# Patient Record
Sex: MALE | Race: WHITE | NOT HISPANIC OR LATINO | Employment: FULL TIME | URBAN - NONMETROPOLITAN AREA
[De-identification: names, ages, dates, MRNs, and addresses within clinical notes are randomized per-mention and may not be internally consistent; named-entity substitution may affect disease eponyms.]

---

## 2017-01-10 ENCOUNTER — PATIENT MESSAGE (OUTPATIENT)
Dept: MEDICAL GROUP | Facility: PHYSICIAN GROUP | Age: 32
End: 2017-01-10

## 2018-01-20 DIAGNOSIS — F33.1 MAJOR DEPRESSIVE DISORDER, RECURRENT EPISODE, MODERATE (HCC): ICD-10-CM

## 2018-01-22 RX ORDER — PAROXETINE HYDROCHLORIDE 20 MG/1
TABLET, FILM COATED ORAL
Qty: 30 TAB | Refills: 0 | Status: SHIPPED | OUTPATIENT
Start: 2018-01-22 | End: 2018-02-21 | Stop reason: SDUPTHER

## 2018-02-21 ENCOUNTER — OFFICE VISIT (OUTPATIENT)
Dept: MEDICAL GROUP | Facility: PHYSICIAN GROUP | Age: 33
End: 2018-02-21
Payer: COMMERCIAL

## 2018-02-21 VITALS
SYSTOLIC BLOOD PRESSURE: 118 MMHG | WEIGHT: 195 LBS | DIASTOLIC BLOOD PRESSURE: 78 MMHG | HEIGHT: 70 IN | BODY MASS INDEX: 27.92 KG/M2 | RESPIRATION RATE: 12 BRPM | TEMPERATURE: 98.4 F | OXYGEN SATURATION: 96 % | HEART RATE: 95 BPM

## 2018-02-21 DIAGNOSIS — Z13.220 SCREENING FOR LIPID DISORDERS: ICD-10-CM

## 2018-02-21 DIAGNOSIS — F33.1 MODERATE EPISODE OF RECURRENT MAJOR DEPRESSIVE DISORDER (HCC): ICD-10-CM

## 2018-02-21 DIAGNOSIS — F33.1 MAJOR DEPRESSIVE DISORDER, RECURRENT EPISODE, MODERATE (HCC): ICD-10-CM

## 2018-02-21 PROCEDURE — 99214 OFFICE O/P EST MOD 30 MIN: CPT | Performed by: NURSE PRACTITIONER

## 2018-02-21 RX ORDER — PAROXETINE HYDROCHLORIDE 20 MG/1
20 TABLET, FILM COATED ORAL DAILY
Qty: 90 TAB | Refills: 3 | Status: SHIPPED | OUTPATIENT
Start: 2018-02-21 | End: 2019-02-19 | Stop reason: SDUPTHER

## 2018-02-21 ASSESSMENT — PATIENT HEALTH QUESTIONNAIRE - PHQ9
5. POOR APPETITE OR OVEREATING: 0 - NOT AT ALL
SUM OF ALL RESPONSES TO PHQ QUESTIONS 1-9: 5
CLINICAL INTERPRETATION OF PHQ2 SCORE: 1

## 2018-02-21 NOTE — ASSESSMENT & PLAN NOTE
Patient is a 32-year-old male with major depressive disorder for nearly 10 years, this has been well-controlled on Paxil 20 mg daily. He is here to establish care. We will refill him for one year. He has had no worsening depression or anxiety.

## 2018-02-21 NOTE — PROGRESS NOTES
"[facility]  Primary Care Office Visit - Problem-Oriented        History:     Deepak Gamboa is a 32 y.o. male who is here today to discuss Depressed (med refill) and Immunizations (TDAP)      Major depressive disorder  Patient is a 32-year-old male with major depressive disorder for nearly 10 years, this has been well-controlled on Paxil 20 mg daily. He is here to establish care. We will refill him for one year. He has had no worsening depression or anxiety.        Past Medical History:   Diagnosis Date   • Major depressive disorder 9/21/2012     History reviewed. No pertinent surgical history.  Social History     Social History   • Marital status: Single     Spouse name: N/A   • Number of children: N/A   • Years of education: N/A     Occupational History   • Not on file.     Social History Main Topics   • Smoking status: Never Smoker   • Smokeless tobacco: Never Used   • Alcohol use No   • Drug use: No   • Sexual activity: Yes     Other Topics Concern   • Not on file     Social History Narrative   • No narrative on file     History   Smoking Status   • Never Smoker   Smokeless Tobacco   • Never Used     Family History   Problem Relation Age of Onset   • Stroke Father      No Known Allergies    Problem List:     Patient Active Problem List    Diagnosis Date Noted   • Major depressive disorder 09/21/2012         Medications:     Current Outpatient Prescriptions:   •  PARoxetine (PAXIL) 20 MG Tab, Take 1 Tab by mouth every day. TAKE ONE TABLET BY MOUTH ONCE DAILY  WITH FOOD, Disp: 90 Tab, Rfl: 3      Review of Systems:     Pertinent positives as per HPI, all other systems reviewed and WNL     Physical Assessment:     VS: /78   Pulse 95   Temp 36.9 °C (98.4 °F)   Resp 12   Ht 1.778 m (5' 10\")   Wt 88.5 kg (195 lb)   SpO2 96%   BMI 27.98 kg/m²     General: Well-developed, well-nourished, male, over weight   Head: PERRL, EOMI. Normocephalic. No facial asymmetry noted.  Cardiovasc: RRR, no MRG. No " thrills or bruits. Pulses 2+ and symmetric at all distal extremities.  Pulmonary: Lungs clear bilaterally.  Normal respiratory effort. No wheeze or crackles.  Neuro: Alert and oriented  Skin:No rashes noted. Skin warm, dry, intact    Psych: Dressed appropriately for the weather, pleasant and conversant.  Affect, mood & judgment appropriate.      Assessment/Plan:   Deepak was seen today for depressed and immunizations.    Diagnoses and all orders for this visit:    Moderate episode of recurrent major depressive disorder (CMS-HCC), chronic, well controlled on present medications   -Continue current treatment, continue to support and monitor, follow up annually  -     PARoxetine (PAXIL) 20 MG Tab; Take 1 Tab by mouth every day. TAKE ONE TABLET BY MOUTH ONCE DAILY  WITH FOOD    Screening for lipid disorders  -     LIPID PROFILE; Future  -     COMP METABOLIC PANEL; Future      Patient is agreeable to the above plan and voiced understanding. All questions answered.     Please note that this dictation was created using voice recognition software. I have made every reasonable attempt to correct obvious errors, but I expect that there are errors of grammar and possibly content that I did not discover before finalizing the note.      VIJI Reese  2/21/2018, 10:26 AM

## 2019-02-19 DIAGNOSIS — F33.1 MODERATE EPISODE OF RECURRENT MAJOR DEPRESSIVE DISORDER (HCC): ICD-10-CM

## 2019-02-21 RX ORDER — PAROXETINE HYDROCHLORIDE 20 MG/1
TABLET, FILM COATED ORAL
Qty: 90 TAB | Refills: 0 | Status: SHIPPED | OUTPATIENT
Start: 2019-02-21 | End: 2019-02-27 | Stop reason: SDUPTHER

## 2019-02-21 NOTE — TELEPHONE ENCOUNTER
Was the patient seen in the last year in this department? Yes    Does patient have an active prescription for medications requested? No     Received Request Via: Pharmacy      Pt met protocol?: No, OV 2/18

## 2019-02-27 ENCOUNTER — OFFICE VISIT (OUTPATIENT)
Dept: MEDICAL GROUP | Facility: PHYSICIAN GROUP | Age: 34
End: 2019-02-27

## 2019-02-27 VITALS
RESPIRATION RATE: 14 BRPM | WEIGHT: 200.5 LBS | TEMPERATURE: 98.4 F | HEIGHT: 70 IN | BODY MASS INDEX: 28.71 KG/M2 | SYSTOLIC BLOOD PRESSURE: 120 MMHG | DIASTOLIC BLOOD PRESSURE: 80 MMHG | OXYGEN SATURATION: 96 % | HEART RATE: 105 BPM

## 2019-02-27 DIAGNOSIS — F33.1 MODERATE EPISODE OF RECURRENT MAJOR DEPRESSIVE DISORDER (HCC): ICD-10-CM

## 2019-02-27 PROCEDURE — 99213 OFFICE O/P EST LOW 20 MIN: CPT | Performed by: NURSE PRACTITIONER

## 2019-02-27 RX ORDER — PAROXETINE HYDROCHLORIDE 20 MG/1
TABLET, FILM COATED ORAL
Qty: 90 TAB | Refills: 3 | Status: SHIPPED | OUTPATIENT
Start: 2019-02-27 | End: 2020-03-12 | Stop reason: SDUPTHER

## 2019-02-27 NOTE — PROGRESS NOTES
"[facility]  Primary Care Office Visit - Problem-Oriented        History:     Deepak Gamboa is a 33 y.o. male who is here today to discuss Depression (medication refill-paxil)      Major depressive disorder  This is a chronic condition which is well controlled on medications. Patient is tolerating medications without side effects.      Not interested in influenza or Tdap     Past Medical History:   Diagnosis Date   • Major depressive disorder 9/21/2012     History reviewed. No pertinent surgical history.  Social History     Social History   • Marital status: Single     Spouse name: N/A   • Number of children: N/A   • Years of education: N/A     Occupational History   • Not on file.     Social History Main Topics   • Smoking status: Never Smoker   • Smokeless tobacco: Never Used   • Alcohol use No   • Drug use: No   • Sexual activity: Yes     Other Topics Concern   • Not on file     Social History Narrative   • No narrative on file     History   Smoking Status   • Never Smoker   Smokeless Tobacco   • Never Used     Family History   Problem Relation Age of Onset   • Stroke Father      No Known Allergies    Problem List:     Patient Active Problem List    Diagnosis Date Noted   • Major depressive disorder 09/21/2012         Medications:     Current Outpatient Prescriptions:   •  PARoxetine (PAXIL) 20 MG Tab, TAKE 1 TABLET BY MOUTH ONCE DAILY WITH FOOD, Disp: 90 Tab, Rfl: 3      Review of Systems:     Pertinent positives as per HPI, all other systems reviewed and WNL     Physical Assessment:     VS: /80 (BP Location: Left arm, Patient Position: Sitting, BP Cuff Size: Adult)   Pulse (!) 105   Temp 36.9 °C (98.4 °F)   Resp 14   Ht 1.778 m (5' 10\")   Wt 90.9 kg (200 lb 8 oz)   SpO2 96%   BMI 28.77 kg/m²     General: Well-developed, well-nourished, male     Head: PERRL, EOMI. Normocephalic. No facial asymmetry noted.  Cardiovasc:RRR, no MRG. No thrills or bruits. Pulses 2+ and symmetric at all distal " extremities.  Pulmonary: Lungs clear bilaterally.  Normal respiratory effort. No wheeze or crackles.   Neuro: Alert and oriented, CNs II-XII intact, no focal deficits.  Skin:No rashes noted. Skin warm, dry, intact    Psych: Dressed appropriately for the weather, pleasant and conversant.  Affect, mood & judgment appropriate.    Assessment/Plan:   Deepak was seen today for depression.    Diagnoses and all orders for this visit:    Moderate episode of recurrent major depressive disorder (HCC), chronic, controlled on present treatment, monitor at least annually   -     PARoxetine (PAXIL) 20 MG Tab; TAKE 1 TABLET BY MOUTH ONCE DAILY WITH FOOD      Patient is agreeable to the above plan and voiced understanding. All questions answered.     Please note that this dictation was created using voice recognition software. I have made every reasonable attempt to correct obvious errors, but I expect that there are errors of grammar and possibly content that I did not discover before finalizing the note.      VIJI Reese  2/27/2019, 11:01 AM

## 2019-11-19 ENCOUNTER — OFFICE VISIT (OUTPATIENT)
Dept: URGENT CARE | Facility: PHYSICIAN GROUP | Age: 34
End: 2019-11-19

## 2019-11-19 VITALS
TEMPERATURE: 97.6 F | WEIGHT: 193 LBS | DIASTOLIC BLOOD PRESSURE: 88 MMHG | SYSTOLIC BLOOD PRESSURE: 122 MMHG | HEART RATE: 104 BPM | BODY MASS INDEX: 27.63 KG/M2 | RESPIRATION RATE: 16 BRPM | OXYGEN SATURATION: 96 % | HEIGHT: 70 IN

## 2019-11-19 DIAGNOSIS — J03.90 TONSILLITIS: ICD-10-CM

## 2019-11-19 LAB
INT CON NEG: NEGATIVE
INT CON POS: POSITIVE
S PYO AG THROAT QL: NEGATIVE

## 2019-11-19 PROCEDURE — 99214 OFFICE O/P EST MOD 30 MIN: CPT | Performed by: PHYSICIAN ASSISTANT

## 2019-11-19 PROCEDURE — 87880 STREP A ASSAY W/OPTIC: CPT | Performed by: PHYSICIAN ASSISTANT

## 2019-11-19 RX ORDER — AMOXICILLIN 500 MG/1
500 CAPSULE ORAL 2 TIMES DAILY
Qty: 20 CAP | Refills: 0 | Status: SHIPPED | OUTPATIENT
Start: 2019-11-19 | End: 2019-11-29

## 2019-11-19 RX ORDER — METHYLPREDNISOLONE 4 MG/1
TABLET ORAL
Qty: 1 PACKAGE | Refills: 0 | Status: SHIPPED
Start: 2019-11-19 | End: 2020-03-12

## 2019-11-22 ASSESSMENT — ENCOUNTER SYMPTOMS
SHORTNESS OF BREATH: 0
MUSCULOSKELETAL NEGATIVE: 1
DIZZINESS: 0
FEVER: 0
CHILLS: 0
SPUTUM PRODUCTION: 0
SORE THROAT: 1
HEMOPTYSIS: 0
TROUBLE SWALLOWING: 1
NAUSEA: 0
VOMITING: 0
DIARRHEA: 0
ABDOMINAL PAIN: 0
SWOLLEN GLANDS: 1
COUGH: 0
WHEEZING: 0

## 2019-11-22 NOTE — PROGRESS NOTES
"Subjective:      Deepak Gamboa is a 34 y.o. male who presents with Pharyngitis            Pharyngitis    This is a new problem. The current episode started in the past 7 days. The problem has been unchanged. Neither side of throat is experiencing more pain than the other. The pain is at a severity of 3/10. The pain is mild. Associated symptoms include swollen glands and trouble swallowing. Pertinent negatives include no abdominal pain, congestion, coughing, diarrhea, ear pain, shortness of breath or vomiting. He has had exposure to strep. He has had no exposure to mono. He has tried nothing for the symptoms.     No known sick contacts or history of tonsillectomy.     Review of Systems   Constitutional: Negative for chills and fever.   HENT: Positive for sore throat and trouble swallowing. Negative for congestion and ear pain.    Respiratory: Negative for cough, hemoptysis, sputum production, shortness of breath and wheezing.    Cardiovascular: Negative for chest pain.   Gastrointestinal: Negative for abdominal pain, diarrhea, nausea and vomiting.   Genitourinary: Negative.    Musculoskeletal: Negative.    Skin: Negative for rash.   Neurological: Negative for dizziness.        Objective:     /88 (BP Location: Left arm, Patient Position: Sitting, BP Cuff Size: Large adult)   Pulse (!) 104   Temp 36.4 °C (97.6 °F) (Temporal)   Resp 16   Ht 1.778 m (5' 10\")   Wt 87.5 kg (193 lb)   SpO2 96%   BMI 27.69 kg/m²      Physical Exam  Vitals signs and nursing note reviewed.   Constitutional:       Appearance: Normal appearance. He is well-developed. He is ill-appearing.   HENT:      Head: Normocephalic and atraumatic.      Right Ear: Hearing, tympanic membrane, ear canal and external ear normal.      Left Ear: Hearing, tympanic membrane, ear canal and external ear normal.      Nose: No congestion or rhinorrhea.      Mouth/Throat:      Pharynx: Uvula midline. Posterior oropharyngeal erythema present. No " oropharyngeal exudate.      Tonsils: No tonsillar exudate. Swelling: 3+ on the right. 3+ on the left.   Eyes:      General:         Right eye: No discharge.         Left eye: No discharge.      Conjunctiva/sclera: Conjunctivae normal.      Pupils: Pupils are equal, round, and reactive to light.   Neck:      Musculoskeletal: Normal range of motion.   Cardiovascular:      Rate and Rhythm: Normal rate and regular rhythm.      Heart sounds: Normal heart sounds. No murmur.   Pulmonary:      Effort: Pulmonary effort is normal.      Breath sounds: No wheezing or rales.   Musculoskeletal: Normal range of motion.   Neurological:      Mental Status: He is alert and oriented to person, place, and time.   Psychiatric:         Behavior: Behavior normal.            PMH:  has a past medical history of Major depressive disorder (9/21/2012).  MEDS:   Current Outpatient Medications:   •  methylPREDNISolone (MEDROL DOSEPAK) 4 MG Tablet Therapy Pack, Take as directed, Disp: 1 Package, Rfl: 0  •  amoxicillin (AMOXIL) 500 MG Cap, Take 1 Cap by mouth 2 times a day for 10 days., Disp: 20 Cap, Rfl: 0  •  PARoxetine (PAXIL) 20 MG Tab, TAKE 1 TABLET BY MOUTH ONCE DAILY WITH FOOD, Disp: 90 Tab, Rfl: 3  ALLERGIES: No Known Allergies  SURGHX: History reviewed. No pertinent surgical history.  SOCHX:  reports that he has never smoked. He has never used smokeless tobacco. He reports that he does not drink alcohol or use drugs.  FH: family history includes Stroke in his father.       Assessment/Plan:       1. Tonsillitis  - POCT Rapid Strep A: NEGATIVE   - methylPREDNISolone (MEDROL DOSEPAK) 4 MG Tablet Therapy Pack; Take as directed  Dispense: 1 Package; Refill: 0  - amoxicillin (AMOXIL) 500 MG Cap; Take 1 Cap by mouth 2 times a day for 10 days.  Dispense: 20 Cap; Refill: 0   - Complete full course of antibiotics as prescribed     POC strep negative at today's visit, advised patient symptoms are most likely viral in etiology. Encouraged increased  fluids and rest. Encouraged warm salt water gargles as needed for symptomatic relief. Contingent course of amoxicillin provided at today's visit. Call or return to office if symptoms persist or worsen. The patient demonstrated a good understanding and agreed with the treatment plan.

## 2020-03-12 ENCOUNTER — OFFICE VISIT (OUTPATIENT)
Dept: MEDICAL GROUP | Facility: PHYSICIAN GROUP | Age: 35
End: 2020-03-12

## 2020-03-12 VITALS
WEIGHT: 205 LBS | BODY MASS INDEX: 29.35 KG/M2 | OXYGEN SATURATION: 93 % | SYSTOLIC BLOOD PRESSURE: 124 MMHG | TEMPERATURE: 97.1 F | HEIGHT: 70 IN | HEART RATE: 89 BPM | RESPIRATION RATE: 14 BRPM | DIASTOLIC BLOOD PRESSURE: 80 MMHG

## 2020-03-12 DIAGNOSIS — Z13.29 SCREENING FOR THYROID DISORDER: ICD-10-CM

## 2020-03-12 DIAGNOSIS — F33.1 MODERATE EPISODE OF RECURRENT MAJOR DEPRESSIVE DISORDER (HCC): ICD-10-CM

## 2020-03-12 DIAGNOSIS — Z13.21 ENCOUNTER FOR VITAMIN DEFICIENCY SCREENING: ICD-10-CM

## 2020-03-12 DIAGNOSIS — Z13.6 SCREENING FOR CARDIOVASCULAR CONDITION: ICD-10-CM

## 2020-03-12 PROCEDURE — 99213 OFFICE O/P EST LOW 20 MIN: CPT | Performed by: NURSE PRACTITIONER

## 2020-03-12 RX ORDER — PAROXETINE HYDROCHLORIDE 20 MG/1
TABLET, FILM COATED ORAL
Qty: 90 TAB | Refills: 3 | Status: SHIPPED | OUTPATIENT
Start: 2020-03-12 | End: 2021-03-24 | Stop reason: SDUPTHER

## 2020-03-12 ASSESSMENT — PATIENT HEALTH QUESTIONNAIRE - PHQ9
3. TROUBLE FALLING OR STAYING ASLEEP OR SLEEPING TOO MUCH: SEVERAL DAYS
5. POOR APPETITE OR OVEREATING: NOT AT ALL
SUM OF ALL RESPONSES TO PHQ9 QUESTIONS 1 AND 2: 0
2. FEELING DOWN, DEPRESSED, IRRITABLE, OR HOPELESS: NOT AT ALL
SUM OF ALL RESPONSES TO PHQ QUESTIONS 1-9: 2
4. FEELING TIRED OR HAVING LITTLE ENERGY: SEVERAL DAYS
1. LITTLE INTEREST OR PLEASURE IN DOING THINGS: NOT AT ALL
7. TROUBLE CONCENTRATING ON THINGS, SUCH AS READING THE NEWSPAPER OR WATCHING TELEVISION: NOT AT ALL
6. FEELING BAD ABOUT YOURSELF - OR THAT YOU ARE A FAILURE OR HAVE LET YOURSELF OR YOUR FAMILY DOWN: NOT AL ALL
8. MOVING OR SPEAKING SO SLOWLY THAT OTHER PEOPLE COULD HAVE NOTICED. OR THE OPPOSITE, BEING SO FIGETY OR RESTLESS THAT YOU HAVE BEEN MOVING AROUND A LOT MORE THAN USUAL: NOT AT ALL
9. THOUGHTS THAT YOU WOULD BE BETTER OFF DEAD, OR OF HURTING YOURSELF: NOT AT ALL

## 2020-03-12 NOTE — ASSESSMENT & PLAN NOTE
Patient is 35-year-old male here to establish care with me today.  Reports depression has been a chronic health problem for over 10 years.  Has been taking Paxil with good management since then.  Has not had counseling in the past.  Does not exercise routinely, though is very active at work as .  .  Denies SI/HI.

## 2020-03-12 NOTE — PROGRESS NOTES
"CC: Establish care, depression    HISTORY OF THE PRESENT ILLNESS: Patient is a 35 y.o. male. This pleasant patient is here today to establish care and for evaluation and management of the following health problems.  Patient's previous primary care provider is Soledad SORIA.      Health Maintenance:  Patient declined flu vaccine, though we reviewed benefits of flu vaccine.  Encouraged him to wash his hands frequently and stay out of environments with people who are ill.        Major depressive disorder  Patient is 35-year-old male here to establish care with me today.  Reports depression has been a chronic health problem for over 10 years.  Has been taking Paxil with good management since then.  Has not had counseling in the past.  Does not exercise routinely, though is very active at work as .  .  Denies SI/HI.        Allergies: Patient has no known allergies.    Current Outpatient Medications Ordered in Epic   Medication Sig Dispense Refill   • PARoxetine (PAXIL) 20 MG Tab TAKE 1 TABLET BY MOUTH ONCE DAILY WITH FOOD 90 Tab 3     No current Epic-ordered facility-administered medications on file.        Past Medical History:   Diagnosis Date   • Major depressive disorder 9/21/2012       History reviewed. No pertinent surgical history.    Social History     Tobacco Use   • Smoking status: Never Smoker   • Smokeless tobacco: Never Used   Substance Use Topics   • Alcohol use: No     Alcohol/week: 0.0 oz   • Drug use: No       Family History   Problem Relation Age of Onset   • Stroke Father        ROS:      As in HPI, otherwise negative for chest pain, dyspnea, abdominal pain, dysuria, blood in stool, fever             Exam: /80 (BP Location: Right arm, Patient Position: Sitting, BP Cuff Size: Adult)   Pulse 89   Temp 36.2 °C (97.1 °F)   Resp 14   Ht 1.778 m (5' 10\")   Wt 93 kg (205 lb)   SpO2 93%  Body mass index is 29.41 kg/m².    General: Alert, pleasant, well nourished, well " developed male in NAD  HEENT: Normocephalic. Eyes conjunctiva clear lids without ptosis, pupils equal and reactive to light, ears normal shape and contour, canals are clear bilaterally, tympanic membranes are pearly gray with good light reflex, nasal mucosa without erythema and drainage, oropharynx is without erythema, edema or exudates.   Neck: Supple without bruit. Thyroid is not enlarged.  Pulmonary: Clear to ausculation.  Normal effort. No rales, ronchi, or wheezing.  Cardiovascular: Normal rate and rhythm without murmur. Carotid and radial pulses are intact and equal bilaterally.  No lower extremity edema.  Abdomen: Soft, nontender, nondistended. Normal bowel sounds. Liver and spleen are not palpable  Neurologic: Grossly nonfocal  Lymph: No cervical or supraclavicular lymph nodes are palpable  Skin: Warm and dry.  Sunburnt on arms and legs.  Musculoskeletal: Normal gait.   Psych: Normal mood and affect. Alert and oriented. Judgment and insight is normal.    Please note that this dictation was created using voice recognition software. I have made every reasonable attempt to correct obvious errors, but I expect that there are errors of grammar and possibly content that I did not discover before finalizing the note.      Assessment/Plan  1. Moderate episode of recurrent major depressive disorder (HCC)  Well-controlled.  Continue with Paxil.  Advised on routine aerobic exercise.  - PARoxetine (PAXIL) 20 MG Tab; TAKE 1 TABLET BY MOUTH ONCE DAILY WITH FOOD  Dispense: 90 Tab; Refill: 3    2. Screening for cardiovascular condition    - Comp Metabolic Panel; Future  - ESTIMATED GFR; Future    3. Screening for thyroid disorder    - TSH; Future  - FREE THYROXINE; Future    4. Encounter for vitamin deficiency screening    - VITAMIN D,25 HYDROXY; Future    Patient has not had any screening lab work done.  He does not have any insurance at this point, though he is given the option at work.  Reports when he did have  "insurance, seems like \"they never covered anything.\"  He will get labs done at cash pay at [facility].  If can only afford one lab, advised patient to get CMP. Strongly advised patient to look into health insurance coverage just in case of hospitalization.  Advised on sunscreen and keeping covered while working outside.    Patient will return to clinic annually or sooner if needed.  "

## 2020-11-18 ENCOUNTER — OFFICE VISIT (OUTPATIENT)
Dept: MEDICAL GROUP | Facility: PHYSICIAN GROUP | Age: 35
End: 2020-11-18

## 2020-11-18 VITALS
BODY MASS INDEX: 28.35 KG/M2 | WEIGHT: 198 LBS | SYSTOLIC BLOOD PRESSURE: 118 MMHG | HEIGHT: 70 IN | HEART RATE: 104 BPM | TEMPERATURE: 97.8 F | OXYGEN SATURATION: 96 % | DIASTOLIC BLOOD PRESSURE: 84 MMHG

## 2020-11-18 DIAGNOSIS — H92.03 ACUTE OTALGIA, BILATERAL: ICD-10-CM

## 2020-11-18 PROCEDURE — 99213 OFFICE O/P EST LOW 20 MIN: CPT | Performed by: NURSE PRACTITIONER

## 2020-11-18 RX ORDER — AMOXICILLIN 500 MG/1
500 CAPSULE ORAL 3 TIMES DAILY
Qty: 30 CAP | Refills: 0 | Status: SHIPPED | OUTPATIENT
Start: 2020-11-18 | End: 2021-03-24

## 2020-11-18 NOTE — PROGRESS NOTES
"CC: Otalgia    HISTORY OF THE PRESENT ILLNESS: Patient is a 35 y.o. male. This pleasant patient is here today for evaluation and management of the following health problems.      Acute otalgia, bilateral  Patient reports bilateral otalgia.  Left ear started 2 weeks ago, wax and waned.  Woke this morning with both ears hurting.  He is worried that he has an ear infection.  Denies fever, chills, sore throat, cough, nasal congestion.  Does wear earplugs at night because his neighbor is noisy.      Allergies: Patient has no known allergies.    Current Outpatient Medications Ordered in Epic   Medication Sig Dispense Refill   • PARoxetine (PAXIL) 20 MG Tab TAKE 1 TABLET BY MOUTH ONCE DAILY WITH FOOD 90 Tab 3     No current Epic-ordered facility-administered medications on file.        Past Medical History:   Diagnosis Date   • Major depressive disorder 9/21/2012       No past surgical history on file.    Social History     Tobacco Use   • Smoking status: Never Smoker   • Smokeless tobacco: Never Used   Substance Use Topics   • Alcohol use: No     Alcohol/week: 0.0 oz   • Drug use: No       Family History   Problem Relation Age of Onset   • Stroke Father        ROS:   As in HPI, otherwise negative for chest pain, dyspnea, abdominal pain, dysuria, blood in stool, fever          Exam: /84 (BP Location: Left arm, Patient Position: Sitting, BP Cuff Size: Adult)   Pulse (!) 104   Temp 36.6 °C (97.8 °F) (Temporal)   Ht 1.778 m (5' 10\")   Wt 89.8 kg (198 lb)   SpO2 96%  Body mass index is 28.41 kg/m².    General: Well nourished, well developed male in NAD  HEENT: Normocephalic. Eyes conjunctiva clear lids without ptosis, pupils equal and reactive to light, ears normal shape and contour   Ear canals packed with dark cerumen.  Ear lavage performed by medical assistant, and copious amounts of cerumen removed.  After ear lavage, canals are pink and intact, clear of cerumen.  Left tympanic membranes are pearly gray with " good light reflex, non bulging.  Right tympanic membrane mildly red.  Oropharynx is without erythema, edema or exudates.   Neck: Supple without bruit. Thyroid is not enlarged.  Pulmonary: Clear to ausculation.  Normal effort. No rales, ronchi, or wheezing.  Cardiovascular: Normal rate and rhythm without murmur.   Neurologic: Grossly nonfocal  Lymph: No cervical or supraclavicular lymph nodes are palpable  Skin: Warm and dry.    Psych: Normal mood and affect. Alert and oriented. Judgment and insight is normal.    Please note that this dictation was created using voice recognition software. I have made every reasonable attempt to correct obvious errors, but I expect that there are errors of grammar and possibly content that I did not discover before finalizing the note.      Assessment/Plan  1. Acute otalgia, bilateral  Patient tolerated ear lavage.  Patient reports good relief of pain.  Right TM with mild redness.  Will prescribe contingent antibiotic to take if pain returns or worsens.  Prescribed amoxicillin.  Instructions and side effects reviewed with patient.

## 2020-11-19 ENCOUNTER — TELEPHONE (OUTPATIENT)
Dept: MEDICAL GROUP | Facility: PHYSICIAN GROUP | Age: 35
End: 2020-11-19

## 2020-11-19 NOTE — ASSESSMENT & PLAN NOTE
Patient reports bilateral otalgia.  Left ear started 2 weeks ago, wax and waned.  Woke this morning with both ears hurting.  He is worried that he has an ear infection.  Denies fever, chills, sore throat, cough, nasal congestion.  Does wear earplugs at night because his neighbor is noisy.

## 2020-11-19 NOTE — TELEPHONE ENCOUNTER
Deepak called to let us know that both of his ears are still hurting very bad. Is there a numbing drop we can send in for him?

## 2020-11-20 RX ORDER — OFLOXACIN 3 MG/ML
5 SOLUTION AURICULAR (OTIC) DAILY
Qty: 10 ML | Refills: 0 | Status: SHIPPED | OUTPATIENT
Start: 2020-11-20 | End: 2021-03-24

## 2020-11-20 NOTE — TELEPHONE ENCOUNTER
Pt's family states they have been applying warm compresses and had taken ibuprofen once with no affect. Orders given to pt to continue warm compresses and start ibuprofen/tylenol. Family states pt is still in severe pain with no relief. Informed family about antibiotic orders. Please advise.

## 2020-11-20 NOTE — TELEPHONE ENCOUNTER
No.  Please advise him to start the antibiotic that I prescribed and give it a couple days to start working.  He can apply warm compresses to his ears and take Tylenol or ibuprofen.

## 2020-11-20 NOTE — TELEPHONE ENCOUNTER
I have just sent prescription for ofloxacin ear drops to use in addition to the oral antibiotic to see if this will help.  If patient continues to have severe pain, may need to see ENT.

## 2021-03-24 ENCOUNTER — OFFICE VISIT (OUTPATIENT)
Dept: MEDICAL GROUP | Facility: PHYSICIAN GROUP | Age: 36
End: 2021-03-24

## 2021-03-24 VITALS
TEMPERATURE: 98.3 F | WEIGHT: 210.5 LBS | OXYGEN SATURATION: 99 % | SYSTOLIC BLOOD PRESSURE: 128 MMHG | HEIGHT: 69 IN | HEART RATE: 98 BPM | BODY MASS INDEX: 31.18 KG/M2 | DIASTOLIC BLOOD PRESSURE: 82 MMHG | RESPIRATION RATE: 16 BRPM

## 2021-03-24 DIAGNOSIS — J35.1 LARGE TONSILS: ICD-10-CM

## 2021-03-24 DIAGNOSIS — F33.1 MODERATE EPISODE OF RECURRENT MAJOR DEPRESSIVE DISORDER (HCC): ICD-10-CM

## 2021-03-24 DIAGNOSIS — Z13.6 SCREENING FOR CARDIOVASCULAR CONDITION: ICD-10-CM

## 2021-03-24 PROBLEM — H92.03 ACUTE OTALGIA, BILATERAL: Status: RESOLVED | Noted: 2020-11-18 | Resolved: 2021-03-24

## 2021-03-24 PROCEDURE — 99213 OFFICE O/P EST LOW 20 MIN: CPT | Performed by: NURSE PRACTITIONER

## 2021-03-24 RX ORDER — PAROXETINE HYDROCHLORIDE 20 MG/1
TABLET, FILM COATED ORAL
Qty: 90 TABLET | Refills: 3 | Status: SHIPPED | OUTPATIENT
Start: 2021-03-24 | End: 2022-04-14 | Stop reason: SDUPTHER

## 2021-03-24 NOTE — PROGRESS NOTES
CC: Medication refill, request for labs    HISTORY OF THE PRESENT ILLNESS: Patient is a 36 y.o. male. This pleasant patient is here today for evaluation and management of the following health problems.    Patient was to have labs done prior to today's appointment, but he has not done these.  Unfortunately, he does not have insurance.  I have advised him on cash lab across the street.        Major depressive disorder  This is a chronic health problem that is well controlled with current medications and lifestyle measures.  Taking Paxil 20 mg daily.  Reports father and sister passed away last year.  Patient feels he is grieving appropriately.  His wife and mother are good support.  Denies SI/HI.  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    Large tonsils  Large tonsils found on exam today.  Patient reports he snores frequently.  Denies daytime fatigue.  Reports frequent tonsillitis, though not in the last year.  Has never had sleep study done.      Allergies: Patient has no known allergies.    Current Outpatient Medications Ordered in Epic   Medication Sig Dispense Refill   • PARoxetine (PAXIL) 20 MG Tab TAKE 1 TABLET BY MOUTH ONCE DAILY WITH FOOD 90 tablet 3   • ofloxacin otic sol (FLOXIN OTIC) 0.3 % Solution Administer 5 Drops into affected ear(s) every day. Administer drops to both ears. (Patient not taking: Reported on 3/24/2021) 10 mL 0   • amoxicillin (AMOXIL) 500 MG Cap Take 1 Cap by mouth 3 times a day. (Patient not taking: Reported on 3/24/2021) 30 Cap 0     No current Epic-ordered facility-administered medications on file.       Past Medical History:   Diagnosis Date   • Major depressive disorder 9/21/2012       No past surgical history on file.    Social History     Tobacco Use   • Smoking status: Never Smoker   • Smokeless tobacco: Never Used   Substance Use Topics   • Alcohol use: No     Alcohol/week: 0.0 oz   • Drug use: No       Family History   Problem Relation Age of Onset   •  "Stroke Father        ROS:   As in HPI, otherwise negative for chest pain, dyspnea, abdominal pain, dysuria, blood in stool, fever, night sweats      Exam: /82   Pulse 98   Temp 36.8 °C (98.3 °F) (Temporal)   Resp 16   Ht 1.753 m (5' 9\")   Wt 95.5 kg (210 lb 8 oz)   SpO2 99%  Body mass index is 31.09 kg/m².    General: Alert, pleasant, well nourished, well developed male in NAD  HEENT: Normocephalic. Eyes conjunctiva clear lids without ptosis, pupils equal and reactive to light, ears normal shape and contour, canals are clear bilaterally, right tympanic membranes are pearly gray with good light reflex, left tympanic membrane with small amount of cloudy fluid posteriorly, nasal mucosa without erythema and drainage, oropharynx is without erythema, edema or exudates.  Tonsils 2+, no erythema or exudates  Neck: Supple without bruit. Thyroid is not enlarged.  Pulmonary: Clear to ausculation.  Normal effort. No rales, ronchi, or wheezing.  Cardiovascular: Normal rate and rhythm without murmur. Carotid and radial pulses are intact and equal bilaterally.  No lower extremity edema.  Abdomen: Soft, nontender, nondistended. Normal bowel sounds. Liver and spleen are not palpable  Neurologic: Grossly nonfocal  Lymph: No cervical or supraclavicular lymph nodes are palpable  Skin: Warm and dry.    Musculoskeletal: Normal gait.   Psych: Normal mood and affect. Alert and oriented. Judgment and insight is normal.    Please note that this dictation was created using voice recognition software. I have made every reasonable attempt to correct obvious errors, but I expect that there are errors of grammar and possibly content that I did not discover before finalizing the note.      Assessment/Plan  1. Moderate episode of recurrent major depressive disorder (HCC)  Well-controlled.  Continue with Paxil, no changes.  Advised on routine aerobic exercise.  Patient declines counseling today.  - PARoxetine (PAXIL) 20 MG Tab; TAKE 1 " TABLET BY MOUTH ONCE DAILY WITH FOOD  Dispense: 90 tablet; Refill: 3    2. Screening for cardiovascular condition  Patient reports his mom is requesting he get some labs done.  He is mildly concerned as he used [place] frequently in his job over 15 years ago.  Denies any symptoms.  Patient will get labs done at [facility] [place].  He will let me know once he has these done.  - Comp Metabolic Panel; Future  - ESTIMATED GFR; Future  - Lipid Profile; Future  - CBC WITH DIFFERENTIAL; Future  - FERRITIN; Future  - VITAMIN B12; Future    3. Large tonsils  Tonsils enlarged today, no sign of infection.  Snores frequently.  Did advise patient that he may have obstructive sleep apnea.  Reviewed potential consequences of sleep apnea including heart disease, fatigue.  I advised on referral to ENT or sleep management for sleep study.  Unfortunately, patient does not have insurance.  I strongly advised him to consider signing up for insurance through his employer during next sign up period.    Some fluid seen posteriorly left tympanic membrane.  Advised patient to start Flonase nasal spray daily.    Patient will return to clinic annually or sooner if needed.

## 2021-03-24 NOTE — ASSESSMENT & PLAN NOTE
This is a chronic health problem that is well controlled with current medications and lifestyle measures.  Taking Paxil 20 mg daily.  Reports father and sister passed away last year.  Patient feels he is grieving appropriately.  His wife and mother are good support.  Denies SI/HI.  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

## 2021-03-24 NOTE — ASSESSMENT & PLAN NOTE
Large tonsils found on exam today.  Patient reports he snores frequently.  Denies daytime fatigue.  Reports frequent tonsillitis, though not in the last year.  Has never had sleep study done.

## 2022-04-14 ENCOUNTER — OFFICE VISIT (OUTPATIENT)
Dept: MEDICAL GROUP | Facility: PHYSICIAN GROUP | Age: 37
End: 2022-04-14

## 2022-04-14 VITALS
HEIGHT: 69 IN | BODY MASS INDEX: 31.99 KG/M2 | DIASTOLIC BLOOD PRESSURE: 68 MMHG | OXYGEN SATURATION: 99 % | HEART RATE: 94 BPM | RESPIRATION RATE: 14 BRPM | TEMPERATURE: 98.3 F | SYSTOLIC BLOOD PRESSURE: 118 MMHG | WEIGHT: 216 LBS

## 2022-04-14 DIAGNOSIS — J35.1 LARGE TONSILS: ICD-10-CM

## 2022-04-14 DIAGNOSIS — E66.9 OBESITY (BMI 30-39.9): ICD-10-CM

## 2022-04-14 DIAGNOSIS — M77.8 TENDINITIS OF RIGHT SHOULDER: ICD-10-CM

## 2022-04-14 DIAGNOSIS — F33.1 MODERATE EPISODE OF RECURRENT MAJOR DEPRESSIVE DISORDER (HCC): ICD-10-CM

## 2022-04-14 DIAGNOSIS — Z13.6 SCREENING FOR CARDIOVASCULAR CONDITION: ICD-10-CM

## 2022-04-14 PROCEDURE — 99213 OFFICE O/P EST LOW 20 MIN: CPT | Performed by: NURSE PRACTITIONER

## 2022-04-14 RX ORDER — PAROXETINE HYDROCHLORIDE 20 MG/1
TABLET, FILM COATED ORAL
Qty: 90 TABLET | Refills: 3 | Status: SHIPPED | OUTPATIENT
Start: 2022-04-14 | End: 2023-04-14 | Stop reason: SDUPTHER

## 2022-04-14 ASSESSMENT — PATIENT HEALTH QUESTIONNAIRE - PHQ9
7. TROUBLE CONCENTRATING ON THINGS, SUCH AS READING THE NEWSPAPER OR WATCHING TELEVISION: NOT AT ALL
1. LITTLE INTEREST OR PLEASURE IN DOING THINGS: NOT AT ALL
8. MOVING OR SPEAKING SO SLOWLY THAT OTHER PEOPLE COULD HAVE NOTICED. OR THE OPPOSITE, BEING SO FIGETY OR RESTLESS THAT YOU HAVE BEEN MOVING AROUND A LOT MORE THAN USUAL: NOT AT ALL
5. POOR APPETITE OR OVEREATING: NOT AT ALL
9. THOUGHTS THAT YOU WOULD BE BETTER OFF DEAD, OR OF HURTING YOURSELF: NOT AT ALL
SUM OF ALL RESPONSES TO PHQ QUESTIONS 1-9: 1
3. TROUBLE FALLING OR STAYING ASLEEP OR SLEEPING TOO MUCH: NOT AT ALL
SUM OF ALL RESPONSES TO PHQ9 QUESTIONS 1 AND 2: 0
4. FEELING TIRED OR HAVING LITTLE ENERGY: SEVERAL DAYS
2. FEELING DOWN, DEPRESSED, IRRITABLE, OR HOPELESS: NOT AT ALL
6. FEELING BAD ABOUT YOURSELF - OR THAT YOU ARE A FAILURE OR HAVE LET YOURSELF OR YOUR FAMILY DOWN: NOT AL ALL

## 2022-04-14 NOTE — PATIENT INSTRUCTIONS

## 2022-04-14 NOTE — PROGRESS NOTES
CC: Medication refill    HISTORY OF THE PRESENT ILLNESS: Patient is a 37 y.o. male. This pleasant patient is here today for evaluation and management of the following health problems.        Major depressive disorder  This is a chronic health problem that is well controlled with current medications and lifestyle measures.  Taking paroxetine 20 mg daily.    , wife is supportive.  Denies SI/HI.  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  Does not exercise aerobically routinely.    Tendinitis of right shoulder  Patient reports anterior right shoulder pain since trimming trees about 3 weeks ago.  Was holding sara above head.  Has been sore ever since.  Has not tried anything to help the pain except for got a massage, which did help.  Sometimes pain radiates down anterior arm, especially when reaching backward.  Denies weakness, numbness and tingling, erythema.    Large tonsils  Continues with large tonsils.  Denies pain.  Snoring is better if he lays on his side.  Cannot afford sleep study.      Allergies: Patient has no known allergies.    Current Outpatient Medications Ordered in Epic   Medication Sig Dispense Refill   • PARoxetine (PAXIL) 20 MG Tab TAKE 1 TABLET BY MOUTH ONCE DAILY WITH FOOD 90 Tablet 3     No current Epic-ordered facility-administered medications on file.       Past Medical History:   Diagnosis Date   • Major depressive disorder 9/21/2012       No past surgical history on file.    Social History     Tobacco Use   • Smoking status: Never Smoker   • Smokeless tobacco: Never Used   Substance Use Topics   • Alcohol use: No     Alcohol/week: 0.0 oz   • Drug use: No       Family History   Problem Relation Age of Onset   • Stroke Father    • Other Sister         aspiration   • Multiple Sclerosis Mother    • Other Brother         pituitary tumor       ROS:   As in HPI, otherwise negative for chest pain, dyspnea, abdominal pain, dysuria, blood in stool, fever           Exam:  "/68   Pulse 94   Temp 36.8 °C (98.3 °F) (Temporal)   Resp 14   Ht 1.753 m (5' 9\")   Wt 98 kg (216 lb)   SpO2 99%  Body mass index is 31.9 kg/m².    General: Alert, pleasant, well nourished, well developed male in NAD  HEENT: Normocephalic. Eyes conjunctiva clear lids without ptosis, pupils equal and reactive to light, ears normal shape and contour, canals are clear bilaterally, tympanic membranes are pearly gray with good light reflex, nasal mucosa without erythema and drainage, oropharynx is without erythema, edema or exudates.  Tonsils 2+, no erythema or exudates  Neck: Supple without bruit. Thyroid is not enlarged.  Pulmonary: Clear to ausculation.  Normal effort. No rales, ronchi, or wheezing.  Cardiovascular: Normal rate and rhythm without murmur. Carotid and radial pulses are intact and equal bilaterally.  No lower extremity edema.  Abdomen: Soft, nontender, nondistended. Normal bowel sounds. Liver and spleen are not palpable  Neurologic: Grossly nonfocal  Lymph: No cervical or supraclavicular lymph nodes are palpable  Skin: Warm and dry.    Right shoulder: No erythema or edema, nontender to palpation, full active range of motion  Musculoskeletal: Normal gait.   Psych: Normal mood and affect. Alert and oriented. Judgment and insight is normal.    Please note that this dictation was created using voice recognition software. I have made every reasonable attempt to correct obvious errors, but I expect that there are errors of grammar and possibly content that I did not discover before finalizing the note.      Assessment/Plan  1. Moderate episode of recurrent major depressive disorder (HCC)  Well-controlled.  Continue with paroxetine, no changes.  Advised on routine aerobic exercise.  - PARoxetine (PAXIL) 20 MG Tab; TAKE 1 TABLET BY MOUTH ONCE DAILY WITH FOOD  Dispense: 90 Tablet; Refill: 3    2. Screening for cardiovascular condition  Patient has not been able to get labs done in the past due to " finances.  I will order labs again.  Advised patient on [facility].  - Comp Metabolic Panel; Future  - ESTIMATED GFR; Future  - Lipid Profile; Future    3. Tendinitis of right shoulder  Patient likely has tendinitis of right shoulder.  Advised on ice 2-3 times a day, ibuprofen, acetaminophen.    4. Large tonsils  Likely has sleep apnea.  Advised to continue sleeping on side to help with airway.  Cannot afford sleep study.    5. Obesity (BMI 30-39.9)    - Patient identified as having weight management issue.  Appropriate orders and counseling given.    Patient will return to clinic annually or sooner if needed.

## 2022-04-14 NOTE — ASSESSMENT & PLAN NOTE
This is a chronic health problem that is well controlled with current medications and lifestyle measures.  Taking paroxetine 20 mg daily.    , wife is supportive.  Denies SI/HI.  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  Does not exercise aerobically routinely.

## 2022-04-14 NOTE — ASSESSMENT & PLAN NOTE
Continues with large tonsils.  Denies pain.  Snoring is better if he lays on his side.  Cannot afford sleep study.

## 2022-04-14 NOTE — ASSESSMENT & PLAN NOTE
Patient reports anterior right shoulder pain since trimming trees about 3 weeks ago.  Was holding sara above head.  Has been sore ever since.  Has not tried anything to help the pain except for got a massage, which did help.  Sometimes pain radiates down anterior arm, especially when reaching backward.  Denies weakness, numbness and tingling, erythema.

## 2022-06-28 ENCOUNTER — HOSPITAL ENCOUNTER (OUTPATIENT)
Dept: LAB | Facility: MEDICAL CENTER | Age: 37
End: 2022-06-28
Attending: NURSE PRACTITIONER

## 2022-06-28 DIAGNOSIS — Z13.6 SCREENING FOR CARDIOVASCULAR CONDITION: ICD-10-CM

## 2022-06-28 LAB
ALBUMIN SERPL BCP-MCNC: 4.4 G/DL (ref 3.2–4.9)
ALBUMIN/GLOB SERPL: 1.3 G/DL
ALP SERPL-CCNC: 141 U/L (ref 30–99)
ALT SERPL-CCNC: 18 U/L (ref 2–50)
ANION GAP SERPL CALC-SCNC: 10 MMOL/L (ref 7–16)
AST SERPL-CCNC: 8 U/L (ref 12–45)
BILIRUB SERPL-MCNC: 0.3 MG/DL (ref 0.1–1.5)
BUN SERPL-MCNC: 17 MG/DL (ref 8–22)
CALCIUM SERPL-MCNC: 9.2 MG/DL (ref 8.5–10.5)
CHLORIDE SERPL-SCNC: 105 MMOL/L (ref 96–112)
CHOLEST SERPL-MCNC: 216 MG/DL (ref 100–199)
CO2 SERPL-SCNC: 24 MMOL/L (ref 20–33)
CREAT SERPL-MCNC: 0.93 MG/DL (ref 0.5–1.4)
FASTING STATUS PATIENT QL REPORTED: NORMAL
GFR SERPLBLD CREATININE-BSD FMLA CKD-EPI: 107 ML/MIN/1.73 M 2
GLOBULIN SER CALC-MCNC: 3.3 G/DL (ref 1.9–3.5)
GLUCOSE SERPL-MCNC: 90 MG/DL (ref 65–99)
HDLC SERPL-MCNC: 33 MG/DL
LDLC SERPL CALC-MCNC: 138 MG/DL
POTASSIUM SERPL-SCNC: 4.5 MMOL/L (ref 3.6–5.5)
PROT SERPL-MCNC: 7.7 G/DL (ref 6–8.2)
SODIUM SERPL-SCNC: 139 MMOL/L (ref 135–145)
TRIGL SERPL-MCNC: 227 MG/DL (ref 0–149)

## 2022-06-28 PROCEDURE — 36415 COLL VENOUS BLD VENIPUNCTURE: CPT

## 2022-06-28 PROCEDURE — 80061 LIPID PANEL: CPT

## 2022-06-28 PROCEDURE — 80053 COMPREHEN METABOLIC PANEL: CPT

## 2023-04-14 DIAGNOSIS — F33.1 MODERATE EPISODE OF RECURRENT MAJOR DEPRESSIVE DISORDER (HCC): ICD-10-CM

## 2023-04-14 RX ORDER — PAROXETINE HYDROCHLORIDE 20 MG/1
TABLET, FILM COATED ORAL
Qty: 90 TABLET | Refills: 0 | Status: SHIPPED | OUTPATIENT
Start: 2023-04-14 | End: 2023-04-28 | Stop reason: SDUPTHER

## 2023-04-14 NOTE — TELEPHONE ENCOUNTER
Received request via: Patient    Was the patient seen in the last year in this department? No    Does the patient have an active prescription (recently filled or refills available) for medication(s) requested? No    Does the patient have custodial Plus and need 100 day supply (blood pressure, diabetes and cholesterol meds only)? Patient does not have SCP      Last Office Visit:04/14/2022  Last Labs:06/28/2022  Next appt:04/28/2023

## 2023-04-28 ENCOUNTER — OFFICE VISIT (OUTPATIENT)
Dept: MEDICAL GROUP | Facility: PHYSICIAN GROUP | Age: 38
End: 2023-04-28

## 2023-04-28 VITALS
TEMPERATURE: 97.5 F | DIASTOLIC BLOOD PRESSURE: 78 MMHG | WEIGHT: 209 LBS | OXYGEN SATURATION: 95 % | BODY MASS INDEX: 30.96 KG/M2 | HEART RATE: 93 BPM | SYSTOLIC BLOOD PRESSURE: 120 MMHG | RESPIRATION RATE: 16 BRPM | HEIGHT: 69 IN

## 2023-04-28 DIAGNOSIS — Z13.6 SCREENING FOR CARDIOVASCULAR CONDITION: ICD-10-CM

## 2023-04-28 DIAGNOSIS — R74.8 ELEVATED ALKALINE PHOSPHATASE LEVEL: ICD-10-CM

## 2023-04-28 DIAGNOSIS — F33.1 MODERATE EPISODE OF RECURRENT MAJOR DEPRESSIVE DISORDER (HCC): ICD-10-CM

## 2023-04-28 PROCEDURE — 99213 OFFICE O/P EST LOW 20 MIN: CPT | Performed by: NURSE PRACTITIONER

## 2023-04-28 RX ORDER — PAROXETINE HYDROCHLORIDE 20 MG/1
TABLET, FILM COATED ORAL
Qty: 90 TABLET | Refills: 3 | Status: SHIPPED | OUTPATIENT
Start: 2023-04-28

## 2023-04-28 ASSESSMENT — PATIENT HEALTH QUESTIONNAIRE - PHQ9: CLINICAL INTERPRETATION OF PHQ2 SCORE: 0

## 2023-04-28 NOTE — ASSESSMENT & PLAN NOTE
This is a chronic health problem that is well controlled with current medications and lifestyle measures.  Taking paroxetine 20 mg daily.  Exercising by using [employer].  Supportive wife.  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  Denies SI/HI.

## 2023-04-28 NOTE — PROGRESS NOTES
"CC: Medication refill    HISTORY OF THE PRESENT ILLNESS: Patient is a 38 y.o. male. This pleasant patient is here today for evaluation and management of the following health problems.    Health Maintenance: Advised on Tdap vaccine.  Patient will check with pharmacy on how much it is.  He does not have insurance.      Major depressive disorder  This is a chronic health problem that is well controlled with current medications and lifestyle measures.  Taking paroxetine 20 mg daily.  Exercising by using [employer] boxing.  Supportive wife.  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  Denies SI/HI.      Elevated alkaline phosphatase level  Elevated alkaline phosphatase on lab work done a year ago.  Would like to repeat and will add GGT.  Denies abdominal pain, nausea, diarrhea, malaise, night sweats, unintentional weight loss.    Allergies: Patient has no known allergies.    Current Outpatient Medications Ordered in Epic   Medication Sig Dispense Refill    PARoxetine (PAXIL) 20 MG Tab TAKE 1 TABLET BY MOUTH ONCE DAILY WITH FOOD 90 Tablet 3     No current Epic-ordered facility-administered medications on file.       Past Medical History:   Diagnosis Date    Major depressive disorder 9/21/2012       History reviewed. No pertinent surgical history.    Social History     Tobacco Use    Smoking status: Never    Smokeless tobacco: Never   Substance Use Topics    Alcohol use: No     Alcohol/week: 0.0 oz    Drug use: No       Family History   Problem Relation Age of Onset    Stroke Father     Other Sister         aspiration    Multiple Sclerosis Mother     Other Brother         pituitary tumor       ROS: As in HPI, otherwise negative for chest pain, dyspnea, abdominal pain, dysuria, blood in stool, fever         Exam: /78 (BP Location: Right arm)   Pulse 93   Temp 36.4 °C (97.5 °F) (Temporal)   Resp 16   Ht 1.746 m (5' 8.75\")   Wt 94.8 kg (209 lb)   SpO2 95%  Body mass index is 31.09 kg/m².    General: Alert, " pleasant, well nourished, well developed male in NAD  HEENT: Normocephalic. Eyes conjunctiva clear lids without ptosis, pupils equal and reactive to light, ears normal shape and contour, canals are clear bilaterally, tympanic membranes are pearly gray with good light reflex, nasal mucosa without erythema and drainage, oropharynx is without erythema, edema or exudates.   Neck: Supple without bruit. Thyroid is not enlarged.  Pulmonary: Clear to ausculation.  Normal effort. No rales, ronchi, or wheezing.  Cardiovascular: Normal rate and rhythm without murmur. Carotid and radial pulses are intact and equal bilaterally.  No lower extremity edema.  Abdomen: Soft, nontender, nondistended. Normal bowel sounds. Liver and spleen are not palpable  Neurologic: Grossly nonfocal  Lymph: No cervical or supraclavicular lymph nodes are palpable  Skin: Warm and dry.   Musculoskeletal: Normal gait.   Psych: Normal mood and affect. Alert and oriented. Judgment and insight is normal.    Please note that this dictation was created using voice recognition software. I have made every reasonable attempt to correct obvious errors, but I expect that there are errors of grammar and possibly content that I did not discover before finalizing the note.      Assessment/Plan  1. Moderate episode of recurrent major depressive disorder (HCC)  Well-controlled.  Continue with Paxil at current dose.  Continue with routine aerobic exercise.  - PARoxetine (PAXIL) 20 MG Tab; TAKE 1 TABLET BY MOUTH ONCE DAILY WITH FOOD  Dispense: 90 Tablet; Refill: 3    2. Screening for cardiovascular condition    - Comp Metabolic Panel; Future  - ESTIMATED GFR; Future  - Lipid Profile; Future    3. Elevated alkaline phosphatase level  Reviewed differentials with patient including, but not limited to liver disease, bone disease, inflammatory reaction.  We will get GGT in addition to repeating CMP.  Explained to patient that if GGT is elevated, would like to get ultrasound  of liver.  He verbalizes understanding.  Treatment plan pending results.  - GAMMA GT (GGT); Future      Patient will return to clinic annually or sooner if needed.  He would like to call for appointment.

## 2023-04-28 NOTE — ASSESSMENT & PLAN NOTE
Elevated alkaline phosphatase on lab work done a year ago.  Would like to repeat and will add GGT.  Denies abdominal pain, nausea, diarrhea, malaise, night sweats, unintentional weight loss.

## 2023-07-20 DIAGNOSIS — F33.1 MODERATE EPISODE OF RECURRENT MAJOR DEPRESSIVE DISORDER (HCC): ICD-10-CM

## 2023-07-20 RX ORDER — PAROXETINE HYDROCHLORIDE 20 MG/1
TABLET, FILM COATED ORAL
Qty: 90 TABLET | Refills: 3 | Status: CANCELLED | OUTPATIENT
Start: 2023-07-20

## 2025-01-03 ENCOUNTER — APPOINTMENT (OUTPATIENT)
Dept: MEDICAL GROUP | Facility: PHYSICIAN GROUP | Age: 40
End: 2025-01-03

## 2025-01-03 VITALS
TEMPERATURE: 99.1 F | SYSTOLIC BLOOD PRESSURE: 142 MMHG | RESPIRATION RATE: 16 BRPM | HEIGHT: 68 IN | HEART RATE: 99 BPM | BODY MASS INDEX: 32.58 KG/M2 | DIASTOLIC BLOOD PRESSURE: 70 MMHG | OXYGEN SATURATION: 97 % | WEIGHT: 215 LBS

## 2025-01-03 DIAGNOSIS — Z12.83 SKIN CANCER SCREENING: ICD-10-CM

## 2025-01-03 DIAGNOSIS — R74.8 ELEVATED ALKALINE PHOSPHATASE LEVEL: ICD-10-CM

## 2025-01-03 DIAGNOSIS — F33.1 MODERATE EPISODE OF RECURRENT MAJOR DEPRESSIVE DISORDER (HCC): ICD-10-CM

## 2025-01-03 DIAGNOSIS — E78.5 DYSLIPIDEMIA: ICD-10-CM

## 2025-01-03 PROCEDURE — 3078F DIAST BP <80 MM HG: CPT | Performed by: FAMILY MEDICINE

## 2025-01-03 PROCEDURE — 99214 OFFICE O/P EST MOD 30 MIN: CPT | Performed by: FAMILY MEDICINE

## 2025-01-03 PROCEDURE — 3077F SYST BP >= 140 MM HG: CPT | Performed by: FAMILY MEDICINE

## 2025-01-03 RX ORDER — PAROXETINE 20 MG/1
TABLET, FILM COATED ORAL
Qty: 90 TABLET | Refills: 3 | Status: SHIPPED | OUTPATIENT
Start: 2025-01-03

## 2025-01-03 ASSESSMENT — PATIENT HEALTH QUESTIONNAIRE - PHQ9
SUM OF ALL RESPONSES TO PHQ9 QUESTIONS 1 AND 2: 0
SUM OF ALL RESPONSES TO PHQ QUESTIONS 1-9: 1
3. TROUBLE FALLING OR STAYING ASLEEP OR SLEEPING TOO MUCH: SEVERAL DAYS
1. LITTLE INTEREST OR PLEASURE IN DOING THINGS: NOT AT ALL
7. TROUBLE CONCENTRATING ON THINGS, SUCH AS READING THE NEWSPAPER OR WATCHING TELEVISION: NOT AT ALL
5. POOR APPETITE OR OVEREATING: NOT AT ALL
9. THOUGHTS THAT YOU WOULD BE BETTER OFF DEAD, OR OF HURTING YOURSELF: NOT AT ALL
6. FEELING BAD ABOUT YOURSELF - OR THAT YOU ARE A FAILURE OR HAVE LET YOURSELF OR YOUR FAMILY DOWN: NOT AL ALL
2. FEELING DOWN, DEPRESSED, IRRITABLE, OR HOPELESS: NOT AT ALL
8. MOVING OR SPEAKING SO SLOWLY THAT OTHER PEOPLE COULD HAVE NOTICED. OR THE OPPOSITE, BEING SO FIGETY OR RESTLESS THAT YOU HAVE BEEN MOVING AROUND A LOT MORE THAN USUAL: NOT AT ALL
4. FEELING TIRED OR HAVING LITTLE ENERGY: NOT AT ALL

## 2025-01-04 NOTE — ASSESSMENT & PLAN NOTE
Follow up labs provided. Advised on diet changes to less fried, fatty foods, avoid high sugar and high carbohydrate foods to help with weight loss.

## 2025-01-04 NOTE — ASSESSMENT & PLAN NOTE
Healthy diet changes with less saturated fats, regular activity and gradual weight loss advised  Repeat fasting lipid ordered  Lab Results   Component Value Date/Time    CHOLSTRLTOT 216 (H) 06/28/2022 07:13 AM     (H) 06/28/2022 07:13 AM    HDL 33 (A) 06/28/2022 07:13 AM    TRIGLYCERIDE 227 (H) 06/28/2022 07:13 AM       Lab Results   Component Value Date/Time    SODIUM 139 06/28/2022 07:13 AM    POTASSIUM 4.5 06/28/2022 07:13 AM    CHLORIDE 105 06/28/2022 07:13 AM    CO2 24 06/28/2022 07:13 AM    GLUCOSE 90 06/28/2022 07:13 AM    BUN 17 06/28/2022 07:13 AM    CREATININE 0.93 06/28/2022 07:13 AM     Lab Results   Component Value Date/Time    ALKPHOSPHAT 141 (H) 06/28/2022 07:13 AM    ASTSGOT 8 (L) 06/28/2022 07:13 AM    ALTSGPT 18 06/28/2022 07:13 AM    TBILIRUBIN 0.3 06/28/2022 07:13 AM      The 10-year ASCVD risk score (Peter DK, et al., 2019) is: 3.1%

## 2025-01-04 NOTE — PROGRESS NOTES
"Subjective:   Deepak Gamboa is a 39 y.o. male here today for evaluation and management of:     Major depressive disorder  Stable condition, no exacerbation, no SI, refill for paxil 20 mg provided.     Elevated alkaline phosphatase level  Follow up labs provided. Advised on diet changes to less fried, fatty foods, avoid high sugar and high carbohydrate foods to help with weight loss.     Dyslipidemia  Healthy diet changes with less saturated fats, regular activity and gradual weight loss advised  Repeat fasting lipid ordered  Lab Results   Component Value Date/Time    CHOLSTRLTOT 216 (H) 06/28/2022 07:13 AM     (H) 06/28/2022 07:13 AM    HDL 33 (A) 06/28/2022 07:13 AM    TRIGLYCERIDE 227 (H) 06/28/2022 07:13 AM       Lab Results   Component Value Date/Time    SODIUM 139 06/28/2022 07:13 AM    POTASSIUM 4.5 06/28/2022 07:13 AM    CHLORIDE 105 06/28/2022 07:13 AM    CO2 24 06/28/2022 07:13 AM    GLUCOSE 90 06/28/2022 07:13 AM    BUN 17 06/28/2022 07:13 AM    CREATININE 0.93 06/28/2022 07:13 AM     Lab Results   Component Value Date/Time    ALKPHOSPHAT 141 (H) 06/28/2022 07:13 AM    ASTSGOT 8 (L) 06/28/2022 07:13 AM    ALTSGPT 18 06/28/2022 07:13 AM    TBILIRUBIN 0.3 06/28/2022 07:13 AM      The 10-year ASCVD risk score (Peter DK, et al., 2019) is: 3.1%           Current medicines (including changes today)  Current Outpatient Medications   Medication Sig Dispense Refill    PARoxetine (PAXIL) 20 MG Tab TAKE 1 TABLET BY MOUTH ONCE DAILY WITH FOOD 90 Tablet 3     No current facility-administered medications for this visit.     He  has a past medical history of Major depressive disorder (9/21/2012).    ROS  No chest pain, no shortness of breath, no abdominal pain       Objective:     BP (!) 142/70 (BP Location: Left arm, Patient Position: Sitting, BP Cuff Size: Large adult)   Pulse 99   Temp 37.3 °C (99.1 °F) (Temporal)   Resp 16   Ht 1.727 m (5' 8\")   Wt 97.5 kg (215 lb)   SpO2 97%  Body mass index is 32.69 " kg/m².   Physical Exam:  Constitutional: Alert, no distress.  Skin: Warm, dry, good turgor, no rashes in visible areas.  Eye: Equal, round and reactive, conjunctiva clear, lids normal.  ENMT: Lips without lesions, good dentition, oropharynx clear.  Neck: Trachea midline, no masses, no thyromegaly. No cervical or supraclavicular lymphadenopathy  Respiratory: Unlabored respiratory effort, lungs clear to auscultation, no wheezes, no ronchi.  Cardiovascular: Normal S1, S2, no murmur, no edema.  Abdomen: Soft, non-tender, no masses, no hepatosplenomegaly.  Psych: Alert and oriented x3, normal affect and mood.    Assessment and Plan:   The following treatment plan was discussed    1. Moderate episode of recurrent major depressive disorder (HCC)  - PARoxetine (PAXIL) 20 MG Tab; TAKE 1 TABLET BY MOUTH ONCE DAILY WITH FOOD  Dispense: 90 Tablet; Refill: 3    2. Elevated alkaline phosphatase level  - Comp Metabolic Panel; Future    3. Dyslipidemia  - Lipid Profile; Future    4. Skin cancer screening  - Referral to Dermatology    Advised to get flu, hep b and tetanus vaccines at the pharmacy.   Followup: Return in about 4 months (around 5/3/2025) for Lab Review, elevated bp.